# Patient Record
Sex: FEMALE | Race: WHITE | Employment: FULL TIME | URBAN - METROPOLITAN AREA
[De-identification: names, ages, dates, MRNs, and addresses within clinical notes are randomized per-mention and may not be internally consistent; named-entity substitution may affect disease eponyms.]

---

## 2019-01-21 LAB
CHLAMYDIA, EXTERNAL: NEGATIVE
HBSAG, EXTERNAL: NON REACTIVE
HIV, EXTERNAL: NON REACTIVE
N. GONORRHEA, EXTERNAL: NEGATIVE
RUBELLA, EXTERNAL: NORMAL
T. PALLIDUM, EXTERNAL: NEGATIVE
TYPE, ABO & RH, EXTERNAL: NORMAL

## 2019-08-02 LAB — GRBS, EXTERNAL: NEGATIVE

## 2019-08-28 ENCOUNTER — HOSPITAL ENCOUNTER (INPATIENT)
Age: 24
LOS: 2 days | Discharge: HOME | End: 2019-08-30
Attending: OBSTETRICS & GYNECOLOGY | Admitting: OBSTETRICS & GYNECOLOGY
Payer: COMMERCIAL

## 2019-08-28 LAB
ALBUMIN SERPL-MCNC: 2.5 G/DL (ref 3.5–5)
ALBUMIN/GLOB SERPL: 0.7 {RATIO} (ref 1.1–2.2)
ALP SERPL-CCNC: 274 U/L (ref 45–117)
ALT SERPL-CCNC: 14 U/L (ref 12–78)
ANION GAP SERPL CALC-SCNC: 9 MMOL/L (ref 5–15)
AST SERPL-CCNC: 13 U/L (ref 15–37)
BASOPHILS # BLD: 0 K/UL (ref 0–0.1)
BASOPHILS NFR BLD: 0 % (ref 0–1)
BILIRUB SERPL-MCNC: 0.3 MG/DL (ref 0.2–1)
BUN SERPL-MCNC: 7 MG/DL (ref 6–20)
BUN/CREAT SERPL: 11 (ref 12–20)
CALCIUM SERPL-MCNC: 8.8 MG/DL (ref 8.5–10.1)
CHLORIDE SERPL-SCNC: 106 MMOL/L (ref 97–108)
CO2 SERPL-SCNC: 21 MMOL/L (ref 21–32)
CREAT SERPL-MCNC: 0.62 MG/DL (ref 0.55–1.02)
CREAT UR-MCNC: 59.1 MG/DL
DIFFERENTIAL METHOD BLD: ABNORMAL
EOSINOPHIL # BLD: 0.1 K/UL (ref 0–0.4)
EOSINOPHIL NFR BLD: 1 % (ref 0–7)
ERYTHROCYTE [DISTWIDTH] IN BLOOD BY AUTOMATED COUNT: 14.6 % (ref 11.5–14.5)
GLOBULIN SER CALC-MCNC: 3.7 G/DL (ref 2–4)
GLUCOSE SERPL-MCNC: 94 MG/DL (ref 65–100)
HCT VFR BLD AUTO: 36.6 % (ref 35–47)
HGB BLD-MCNC: 11.6 G/DL (ref 11.5–16)
IMM GRANULOCYTES # BLD AUTO: 0 K/UL (ref 0–0.04)
IMM GRANULOCYTES NFR BLD AUTO: 0 % (ref 0–0.5)
LYMPHOCYTES # BLD: 1.9 K/UL (ref 0.8–3.5)
LYMPHOCYTES NFR BLD: 18 % (ref 12–49)
MCH RBC QN AUTO: 26.9 PG (ref 26–34)
MCHC RBC AUTO-ENTMCNC: 31.7 G/DL (ref 30–36.5)
MCV RBC AUTO: 84.7 FL (ref 80–99)
MONOCYTES # BLD: 0.7 K/UL (ref 0–1)
MONOCYTES NFR BLD: 6 % (ref 5–13)
NEUTS SEG # BLD: 7.7 K/UL (ref 1.8–8)
NEUTS SEG NFR BLD: 75 % (ref 32–75)
NRBC # BLD: 0 K/UL (ref 0–0.01)
NRBC BLD-RTO: 0 PER 100 WBC
PLATELET # BLD AUTO: 191 K/UL (ref 150–400)
PMV BLD AUTO: 11.5 FL (ref 8.9–12.9)
POTASSIUM SERPL-SCNC: 4.2 MMOL/L (ref 3.5–5.1)
PROT SERPL-MCNC: 6.2 G/DL (ref 6.4–8.2)
PROT UR-MCNC: 50 MG/DL (ref 0–11.9)
PROT/CREAT UR-RTO: 0.8
RBC # BLD AUTO: 4.32 M/UL (ref 3.8–5.2)
SODIUM SERPL-SCNC: 136 MMOL/L (ref 136–145)
URATE SERPL-MCNC: 5.5 MG/DL (ref 2.6–6)
WBC # BLD AUTO: 10.4 K/UL (ref 3.6–11)

## 2019-08-28 PROCEDURE — 85025 COMPLETE CBC W/AUTO DIFF WBC: CPT

## 2019-08-28 PROCEDURE — 84156 ASSAY OF PROTEIN URINE: CPT

## 2019-08-28 PROCEDURE — 74011250636 HC RX REV CODE- 250/636: Performed by: ADVANCED PRACTICE MIDWIFE

## 2019-08-28 PROCEDURE — 65270000029 HC RM PRIVATE

## 2019-08-28 PROCEDURE — 84550 ASSAY OF BLOOD/URIC ACID: CPT

## 2019-08-28 PROCEDURE — 36415 COLL VENOUS BLD VENIPUNCTURE: CPT

## 2019-08-28 PROCEDURE — 80053 COMPREHEN METABOLIC PANEL: CPT

## 2019-08-28 PROCEDURE — 76060000078 HC EPIDURAL ANESTHESIA

## 2019-08-28 PROCEDURE — 74011250636 HC RX REV CODE- 250/636

## 2019-08-28 PROCEDURE — 74011250636 HC RX REV CODE- 250/636: Performed by: OBSTETRICS & GYNECOLOGY

## 2019-08-28 PROCEDURE — 4A0HXCZ MEASUREMENT OF PRODUCTS OF CONCEPTION, CARDIAC RATE, EXTERNAL APPROACH: ICD-10-PCS | Performed by: OBSTETRICS & GYNECOLOGY

## 2019-08-28 PROCEDURE — 74011000258 HC RX REV CODE- 258: Performed by: ADVANCED PRACTICE MIDWIFE

## 2019-08-28 PROCEDURE — 75410000002 HC LABOR FEE PER 1 HR

## 2019-08-28 RX ORDER — FENTANYL CITRATE 50 UG/ML
50 INJECTION, SOLUTION INTRAMUSCULAR; INTRAVENOUS
Status: DISCONTINUED | OUTPATIENT
Start: 2019-08-28 | End: 2019-08-29 | Stop reason: HOSPADM

## 2019-08-28 RX ORDER — OXYTOCIN/0.9 % SODIUM CHLORIDE 30/500 ML
PLASTIC BAG, INJECTION (ML) INTRAVENOUS
Status: DISCONTINUED
Start: 2019-08-28 | End: 2019-08-28

## 2019-08-28 RX ORDER — SODIUM CHLORIDE, SODIUM LACTATE, POTASSIUM CHLORIDE, CALCIUM CHLORIDE 600; 310; 30; 20 MG/100ML; MG/100ML; MG/100ML; MG/100ML
125 INJECTION, SOLUTION INTRAVENOUS CONTINUOUS
Status: DISCONTINUED | OUTPATIENT
Start: 2019-08-28 | End: 2019-08-31 | Stop reason: HOSPADM

## 2019-08-28 RX ORDER — OXYTOCIN IN 5 % DEXTROSE 30/500 ML
0-25 PLASTIC BAG, INJECTION (ML) INTRAVENOUS
Status: DISCONTINUED | OUTPATIENT
Start: 2019-08-28 | End: 2019-08-28

## 2019-08-28 RX ORDER — NALBUPHINE HYDROCHLORIDE 10 MG/ML
10 INJECTION, SOLUTION INTRAMUSCULAR; INTRAVENOUS; SUBCUTANEOUS
Status: DISCONTINUED | OUTPATIENT
Start: 2019-08-28 | End: 2019-08-29 | Stop reason: HOSPADM

## 2019-08-28 RX ORDER — OXYTOCIN/0.9 % SODIUM CHLORIDE 30/500 ML
0-25 PLASTIC BAG, INJECTION (ML) INTRAVENOUS
Status: DISCONTINUED | OUTPATIENT
Start: 2019-08-28 | End: 2019-08-31 | Stop reason: HOSPADM

## 2019-08-28 RX ORDER — BUTALBITAL, ACETAMINOPHEN AND CAFFEINE 50; 325; 40 MG/1; MG/1; MG/1
1 TABLET ORAL
COMMUNITY
End: 2019-08-30

## 2019-08-28 RX ORDER — TERBUTALINE SULFATE 1 MG/ML
0.25 INJECTION SUBCUTANEOUS AS NEEDED
Status: DISCONTINUED | OUTPATIENT
Start: 2019-08-28 | End: 2019-08-29 | Stop reason: HOSPADM

## 2019-08-28 RX ADMIN — Medication 1 MILLI-UNITS/MIN: at 18:23

## 2019-08-28 RX ADMIN — NALBUPHINE HYDROCHLORIDE 10 MG: 10 INJECTION, SOLUTION INTRAMUSCULAR; INTRAVENOUS; SUBCUTANEOUS at 21:44

## 2019-08-28 RX ADMIN — PROMETHAZINE HYDROCHLORIDE 25 MG: 25 INJECTION INTRAMUSCULAR; INTRAVENOUS at 21:53

## 2019-08-28 RX ADMIN — SODIUM CHLORIDE, SODIUM LACTATE, POTASSIUM CHLORIDE, AND CALCIUM CHLORIDE 125 ML/HR: 600; 310; 30; 20 INJECTION, SOLUTION INTRAVENOUS at 18:25

## 2019-08-28 NOTE — PROGRESS NOTES
1545 Bedside and verbal report received from DR Lex Gallegos at bedside discussing plan of care with patient. 1949 Bedside and verbal report given to RHIANNON Machuca

## 2019-08-28 NOTE — PROGRESS NOTES
2019  1:11 PM Pt received from office with irregular contractions, , 39.5 weeks, elevated BP today, in L&D for lab work and BP monitoring. Pt states she stopped in bathroom downstairs on way up and doesn't need to void. Changing into gown  1330 PIV to saline lock and labs drawn. Assessment completed. Denies headache or epigastric pain, Swelling increased today, non pitting, slight blurred vision. 1400 Dr. Ailyn Felix at bedside discussing plan to admit patient for early labor and augmentation if needed.  Consent signed  5325 Pt off monitor and up to walk

## 2019-08-28 NOTE — PROGRESS NOTES
PreE labs normal, protein: creatinine pending. BPs mildly elevated - she has blurry vision (x 2 days) but no headache or RUQ pain. At minimum, she has gestational hypertension and clinically seems to be in early labor. Will plan to admit for early labor and manage expectantly for now. If contractions space, recommend pitocin augmentation.   Patient is in agreement and does not feel comfortable leaving the hospital.

## 2019-08-28 NOTE — H&P
Cc: contractions    HPI:   23yo G1 @ 39w5d presented to the office today for a labor check with complaints of contractions every 10 minutes. Her cervix had changed from 2cm/60% yesterday to 3cm/80% today. Incidentally, her BP was elevated. BPs were 130/100, then 130/98. She denies HA, change in vision, RUQ pain. She was sent to L&D to be evaluated for PreEclampsia and possible early labor. Of note, she has a remote history of MRSA infection, but had two negative cultures this pregnancy  in the first and third trimesters. ROS  Additionally reports: Except as noted in the HPI, the review of systems is negative for General and . Past Medical History  Notes: MRSA age 17-15 x2    Allergies  NKDA        Medications     butalbital-acetaminophen-caffeine 50 mg-325 mg-40 mg tablet  TAKE 1 TABLET BY MOUTH EVERY 6 HOURS AS NEEDED FOR HEADACHE 19   filled [address]   Prenatal 19   entered Alyssa Noel     Vaccine Type Date Amt. Route Site [address] Lot # Mfr. Exp.   Date Date  on VIS VIS  Given Vaccinator   Diphtheria, Tetanus, Pertussis   Tdap 19 0.5 mL Intramuscular Deltoid, Right  H54Ex [employer] 06/14/21 02/24/15 06/06/19 Swann Cough                                                     Influenza   influenza, injectable, quadrivalent 10/01/18                                                                 Family History  Maternal Grandmother - Mamalignant tumor of ovary (onset age: 79)  - s/p full hyst   Social History  Social History not reviewed (last reviewed 2019)  OB/GYN Social History  Tobacco Smoking Status: Former smoker  Most Recent Tobacco Use Screenin2019  Marital status: Single (Notes: Stable Relationship)  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 Date of Last Pap Smear: 01/21/2019 (Notes: LSIL). Abnormal Pap: Y. Abnormal Pap Smear result: LSIL (Notes: 1/21/2019). Sexually Active?: Y.  STIs/STDs: N.  Endometriosis: N. Fibroids: N. Ovarian Cyst: N. PCOS: N.  Obstetric History  Obstetric History not reviewed (last reviewed 02/14/2019)    TOTAL FULL PRE AB. I AB.  S ECTOPICS MULTIPLE LIVING   1          Pregnancy Problem List   Low grade squamous intraepithelial lesion on cervical Papanicolaou smear - 1/2019 colpo benign, repeat pap 1/20   History of migraine - fioricet   Primigravida   Methicillin resistant Staphylococcus aureus infection - h/o, culture 1st trim neg, 3rd trim neg    GIRL \"Criss\"  Genetic Screening and Infection History  Patient's Age Will Be 28 Years Or Older At Estimated Date of Delivery: N  2824: Thalassemia ([facility], [place], [place], Or  Background): MCV < 80: N  Neural Tube Defect (Meningomyelocele, Spina Bifida, Or Anencephaly): N  746: Congenital Heart Defect: N  7580: Down Syndrome: N  Morgan-Sachs (eg, [facility]): N  Canavan Disease: N  282: Sickle Cell Disease Or Trait (): N  Hemophilia Or Other Blood Disorders: N  359: Muscular Dystrophy: N  2770: Cystic Fibrosis: N  3334: [place]'s Chorea: N  319: Mental Retardation/Autism: N  If Yes, Was Person Tested For Fragile X?: N  Other Inherited Genetic Or Chromosomal Disorder: N  Maternal Metabolic Disorder (eg, Type 1 Diabetes, PKU): N  Patient Or [de-identified] Father Had A Child With Birth Defects Not Listed Above: N  Recurrent Pregnancy Loss, Or A Stillbirth: N  Medications (including Supplements, Vitamins, Herbs, OTC Drugs), Illicit/Recreational Drugs, Alcohol: N  If Yes, Agent(s) And Strength/Dosage: N  Any Other Genetic History: N  Live With Someone With TB Or Exposed To TB: N  Patient Or Partner Has History Of Genital Herpes: N  Rash Or Viral Illness Since Last Menstrual Period: N  History Of STD, Gonorrhea, Chlamydia, HPV, Syphilis: N  Other Infection History: N  History of HIV: N  History of Hepatitis: N  Prior GBS-infected child: N  Prenatal Flowsheet  Fundus Pres FHR FM PLS Cervix Exam BP Wt Edema Glucose Protein Leukocytes Nitrite Ketones Blood   01/21/2019     [id number]                         177    118/64 163 lbs  none neg       Comments: U/S confirms viable IUP. Reports daily HAs, hx of migraines. Has been taking Tylenol with little relief. Also reports N/V, no meds. Denies discharge/bleeding/cramping. Flu vaccine received elsewhere this season. NO prenatal folder available. Reviewed handouts for aneuploidy testing. Pediatric MA. S/p flu shot. Work precautions. CMV, etc immunity today. Declines all aneuploidy and carrier testing. Distant h/o MRSA. Nares culture collected. Pap today with cultures and PNL   02/14/2019     [place]                         155    120/78 160 lbs  none neg       Comments: Rm 1. ROSA w/ colpo today. reviewed histories and labs, discussed hospitalist/laborist system. LSIL pap, colpo done. Plan PP pap. Plans to decline tetra. HA remain a problem - will start magnesium 500mg daily and rx fioricet provided. 03/14/2019     [place]                       16 wks  148    118/78 160 lbs none none neg       Comments: Rm 1: fioricet helping with headaches, doing well. Tetra today. U/S next visit. 04/09/2019   19 wks 4 days   [place]                       20 wks  148    118/60 162 lbs none none neg       Comments: Rm 2: anatomy scan prior - GIRL, nl anatomy on prelim, post placenta, not feeling FM yet. fioricet still helping with headaches (usually just once a day, when at work)   05/03/2019   23 wks   [place]                         136 Yes   118/70 165 lbs none none neg       Comments: Rm 1: active FM, headaches seem to be getting better, less often.  28wk labs and TDaP next visit   06/06/2019   27 wks 6 days   [place]                       28 cm  140 Yes   122/60 169 lbs none none neg       Comments: Rm 1: doing well, headaches once weekly. Rh+, 28wk labs and TDaP today, reviewed hospital classes, choosing pediatrician and postpartum contraception (plans nexplanon), discussed [address] and PTL precautions   06/20/2019   29 wks 6 days   [place]                       30 cm  136 Yes   104/68 172 lbs none none neg       Comments: Rm 2: elevated GTT, normal 3 hour, headaches gone, doing well. MRSA swab today. 07/02/2019   31 wks 4 days   [place]                       31 cm  125 Yes   118/70 172 lbs trace none trace       Comments: Rm 2: c/o labial swelling and tenderness, stable for the last 4-5 days - labial varicosities. Active FM. No HA.   07/18/2019   33 wks 6 days   [place]                       34 cm  134 Yes   104/72 176 lbs none none neg       Comments: Rm 2: doing well, labial swelling better this week, no swelling unless outside/on feet all day. precautions reviewed, GBS and VScan next visit   08/02/2019   36 wks   [place]                       36 cm Vertex 130 Yes  1cm / 30% / -3 120/76 182 lbs 1+         Comments: Rm 2: GBS (NKDA) and Vscan today, no ctx/bleeding/LOF. Precautions reviewed. 08/08/2019   36 wks 6 days   [place]                       37 cm Vertex 120 Yes  1cm / 50% / -3 119/68 183 lbs 1+         Comments: Rm 1: GBS neg, no ctx. Precautions reviewed. 08/15/2019   37 wks 6 days   [place]                       38 cm Vertex 128 Yes  1cm / 50% / -3 138/80 184 lbs 1+ none neg       Comments: Rm 1: active FM, increased pressure/pain, especially worse over the last 2 days and when standing up. PreE and labor precautions. 08/23/2019   39 wks   [place]                       39 cm  128 Yes  1cm / 60% / -2 120/80 187 lbs 1+ none neg       Comments: Rm 1: Doing well, no ctx, reports pressure starting last night. Precautions reviewed.    08/27/2019   39 wks 4 days   [place]                       39 cm Vertex 130 Yes  2cm / 60% / -2 120/80 190 lbs 1+ none trace       Comments: RM 1: No ctx only pressure. No vb/lof. Membranes stripped per request. Precautions reviewed. 08/28/2019   39 wks 5 days   [email or web address]                       39 cm Vertex 129 Yes  3cm / 80% / -2 130/100   130/98 188 lbs 2+ none trace       Comments: RM 15: C/O consecutive ctx every 10 mins. Pt had membranes swept yesterday. Notes bloody discharge this am. Denies LOF. +FM. Cervix 3/80/-2, pt comfortable. Discussed likely early labor. Initial BP slightly elevated. Denies sx of preE. Gave options of preE labs with BP recheck tomorrow as outpatient vs. evaluation in L&D. Pt desires to go to L&D. Discussed possible need for IOL if rules in for GHTN vs. preE. Strict precautions reviewed. OB Lab Results    Result Value Ref.  Range Date Collected Date Reviewed Reviewed By Note   Initial Labs             Blood Type O  01/21/2019 01/23/2019 [id number]        D (Rh) Type Positive  01/21/2019 01/23/2019 [id number]        Antibody Screen Negative NEGATIVE 01/21/2019 01/23/2019 [id number]        Antibody Screen Negative NEGATIVE 06/06/2019 06/09/2019 [facility]        HCT - Initial 42.0 % 34.0-46.6 01/21/2019 01/23/2019 [id number]        HGB - Initial 14.5 g/dL 11.1-15.9 01/21/2019 01/23/2019 [id number]        MCV - Initial 85 fL 79-97 01/21/2019 01/23/2019 [id number]        PLT - Initial 319 x10E3/uL 150-379 01/21/2019 01/23/2019 [id number]        VDRL - Initial   01/21/2019 01/23/2019 [id number]        Urine Culture/Screen No growth  01/21/2019 01/23/2019 [id number]        HBsAg Negative NEGATIVE 01/21/2019 01/23/2019 [id number]        HIV Counseling/Testing Non Reactive NON REACTIVE 01/21/2019 01/23/2019 [id number]        Chlamydia - Initial Negative NEGATIVE 01/21/2019 01/23/2019 [id number]        Gonorrhea - Initial Negative NEGATIVE 01/21/2019 01/23/2019 [id number]        Varicella             Rubella 2.38 index IMMUNE >0.99 01/21/2019 01/23/2019 [id number]    Optional Labs             HGB Electrophoresis             PPD/Quanta             Pap Test EPCA,CHVIRB  01/21/2019 01/23/2019 [id number]        Cystic Fibrosis             HPV   01/21/2019 01/23/2019 [id number]        Morgan-Sac             Familial Dysautonomia             Genetic Screening Tests             NST             TSH             Drug screen             HCV Ab             HCV RNA             Urinalysis             Rhogam Injection         8-20 Week Labs             Ultrasound - Initial             1st Trimester Aneuploidy Risk Assessment             MSAFP/Multiple Markers Report  03/14/2019 03/17/2019 [facility]        2nd Trimester Serum Screening Negative  03/14/2019 03/17/2019 [facility]        Amnio/CVS             Karyotype             Amniotic Fluid (AFP)         24-28 Week Labs             HCT - 24-28 Weeks 35.6 % 34.0-46.6 06/06/2019 06/09/2019 [facility]        HGB - 24-28 Weeks 12.0 g/dL 11.1-15.9 06/06/2019 06/09/2019 [facility]        MCV - 24-28 Weeks             PLT - 24-28 Weeks 272 x10E3/uL 150-450 06/06/2019 06/09/2019 [facility]        Diabetes Screen 155 mg/dL  06/06/2019 06/09/2019 [facility]        Diabetes Screen Comment  06/12/2019 06/13/2019 [facility]        GTT (If Screen Abnormal) Comment  06/12/2019 06/13/2019 [facility]        D (Rh) Antibody Screen         32-36 Week Labs             HCT - 32-36 Weeks             HGB - 32-36 Weeks             MCV - 32-36 Weeks             PLT - 32-36 Weeks             Ultrasound - 32-36 Weeks             HIV (When Indicated)             VDRL - 32-36 Weeks   06/06/2019 06/09/2019 [facility]        Gonorrhea - 32-36 Weeks             Chlamydia - 32-36 Weeks             Depression screening (when indicated)         35-37 Week Labs             Group B Strep Negative NEGATIVE 08/02/2019 08/05/2019 [facility]        Resistance testing if penicillin allergic         Other Labs Other - PARVOVIRUS B19 IGG+IGM AB, SERUM 0.2 index 0.0-0.8 01/21/2019 01/23/2019 [id number]        Other - CYTOMEGALOVIRUS IGG AB, QUANT, IMMUNOASSAY, SERUM OR PLASMA <0.60 U/mL 0.00-0.59 01/21/2019 01/23/2019 [id number]    Physical Exam  Patient is a 69-year-old female. Constitutional: General Appearance: well developed and nourished and pleasant. Level of Distress: no acute distress. Ambulation: ambulating normally. Head: Head: normocephalic. Ears, Nose, Mouth, Throat: Ears normal hearing. Lungs / Chest: Respiratory effort: unlabored. Abdomen: Inspection and Palpation: Gravid, nontender. Female : Cervix: 3/80/-2 per Dr. Bj Pablo office check; repeat check deferred on admission    Skin: General Skin no rash or suspicious lesions. Neurologic: Gait and Station: normal gait. Sensation: grossly intact. Motor: grossly intact. Mental Status Exam: Orientation oriented to person, place, and time. Assessment / Plan    1. Elevated BP: check PreE labs and serial BP monitoring to evaluate for GHTN vs. PreEclampsia  2. Contractions: will evaluate for early labor. GBS positive  3. IUP: reassuring fetal surveillance.   Intermittent fetal monitoring for now

## 2019-08-29 ENCOUNTER — ANESTHESIA EVENT (OUTPATIENT)
Dept: LABOR AND DELIVERY | Age: 24
End: 2019-08-29
Payer: COMMERCIAL

## 2019-08-29 ENCOUNTER — ANESTHESIA (OUTPATIENT)
Dept: LABOR AND DELIVERY | Age: 24
End: 2019-08-29
Payer: COMMERCIAL

## 2019-08-29 PROCEDURE — 74011250636 HC RX REV CODE- 250/636: Performed by: ANESTHESIOLOGY

## 2019-08-29 PROCEDURE — 0KQM0ZZ REPAIR PERINEUM MUSCLE, OPEN APPROACH: ICD-10-PCS | Performed by: OBSTETRICS & GYNECOLOGY

## 2019-08-29 PROCEDURE — 74011250636 HC RX REV CODE- 250/636

## 2019-08-29 PROCEDURE — 75410000002 HC LABOR FEE PER 1 HR

## 2019-08-29 PROCEDURE — 65410000002 HC RM PRIVATE OB

## 2019-08-29 PROCEDURE — 74011250637 HC RX REV CODE- 250/637: Performed by: ADVANCED PRACTICE MIDWIFE

## 2019-08-29 PROCEDURE — 74011000250 HC RX REV CODE- 250

## 2019-08-29 PROCEDURE — 77030011943

## 2019-08-29 PROCEDURE — 77030014125 HC TY EPDRL BBMI -B: Performed by: ANESTHESIOLOGY

## 2019-08-29 PROCEDURE — 0UQMXZZ REPAIR VULVA, EXTERNAL APPROACH: ICD-10-PCS | Performed by: OBSTETRICS & GYNECOLOGY

## 2019-08-29 PROCEDURE — 75410000000 HC DELIVERY VAGINAL/SINGLE

## 2019-08-29 PROCEDURE — 74011000250 HC RX REV CODE- 250: Performed by: ANESTHESIOLOGY

## 2019-08-29 PROCEDURE — A4300 CATH IMPL VASC ACCESS PORTAL: HCPCS

## 2019-08-29 PROCEDURE — 3E0R3BZ INTRODUCTION OF ANESTHETIC AGENT INTO SPINAL CANAL, PERCUTANEOUS APPROACH: ICD-10-PCS | Performed by: ANESTHESIOLOGY

## 2019-08-29 PROCEDURE — 74011250636 HC RX REV CODE- 250/636: Performed by: OBSTETRICS & GYNECOLOGY

## 2019-08-29 PROCEDURE — 75410000003 HC RECOV DEL/VAG/CSECN EA 0.5 HR

## 2019-08-29 PROCEDURE — 00HU33Z INSERTION OF INFUSION DEVICE INTO SPINAL CANAL, PERCUTANEOUS APPROACH: ICD-10-PCS | Performed by: ANESTHESIOLOGY

## 2019-08-29 RX ORDER — AMMONIA 15 % (W/V)
1 AMPUL (EA) INHALATION AS NEEDED
Status: DISCONTINUED | OUTPATIENT
Start: 2019-08-29 | End: 2019-08-31 | Stop reason: HOSPADM

## 2019-08-29 RX ORDER — FENTANYL/BUPIVACAINE/NS/PF 2-1250MCG
PREFILLED PUMP RESERVOIR EPIDURAL
Status: COMPLETED
Start: 2019-08-29 | End: 2019-08-29

## 2019-08-29 RX ORDER — FENTANYL CITRATE 50 UG/ML
INJECTION, SOLUTION INTRAMUSCULAR; INTRAVENOUS AS NEEDED
Status: DISCONTINUED | OUTPATIENT
Start: 2019-08-29 | End: 2019-08-29 | Stop reason: HOSPADM

## 2019-08-29 RX ORDER — SODIUM CHLORIDE 0.9 % (FLUSH) 0.9 %
5-40 SYRINGE (ML) INJECTION AS NEEDED
Status: DISCONTINUED | OUTPATIENT
Start: 2019-08-29 | End: 2019-08-31 | Stop reason: HOSPADM

## 2019-08-29 RX ORDER — SODIUM CHLORIDE 0.9 % (FLUSH) 0.9 %
5-40 SYRINGE (ML) INJECTION EVERY 8 HOURS
Status: DISCONTINUED | OUTPATIENT
Start: 2019-08-29 | End: 2019-08-31 | Stop reason: HOSPADM

## 2019-08-29 RX ORDER — NALOXONE HYDROCHLORIDE 0.4 MG/ML
0.4 INJECTION, SOLUTION INTRAMUSCULAR; INTRAVENOUS; SUBCUTANEOUS AS NEEDED
Status: DISCONTINUED | OUTPATIENT
Start: 2019-08-29 | End: 2019-08-29 | Stop reason: HOSPADM

## 2019-08-29 RX ORDER — EPHEDRINE SULFATE/0.9% NACL/PF 50 MG/5 ML
10 SYRINGE (ML) INTRAVENOUS
Status: DISCONTINUED | OUTPATIENT
Start: 2019-08-29 | End: 2019-08-29 | Stop reason: HOSPADM

## 2019-08-29 RX ORDER — FENTANYL/BUPIVACAINE/NS/PF 2-1250MCG
1-16 PREFILLED PUMP RESERVOIR EPIDURAL CONTINUOUS
Status: DISCONTINUED | OUTPATIENT
Start: 2019-08-29 | End: 2019-08-31 | Stop reason: HOSPADM

## 2019-08-29 RX ORDER — BUPIVACAINE HYDROCHLORIDE 2.5 MG/ML
INJECTION, SOLUTION EPIDURAL; INFILTRATION; INTRACAUDAL
Status: COMPLETED
Start: 2019-08-29 | End: 2019-08-29

## 2019-08-29 RX ORDER — FENTANYL CITRATE 50 UG/ML
INJECTION, SOLUTION INTRAMUSCULAR; INTRAVENOUS
Status: COMPLETED
Start: 2019-08-29 | End: 2019-08-29

## 2019-08-29 RX ORDER — HYDROCORTISONE 1 %
CREAM (GRAM) TOPICAL AS NEEDED
Status: DISCONTINUED | OUTPATIENT
Start: 2019-08-29 | End: 2019-08-31 | Stop reason: HOSPADM

## 2019-08-29 RX ORDER — BUPIVACAINE HYDROCHLORIDE 2.5 MG/ML
INJECTION, SOLUTION EPIDURAL; INFILTRATION; INTRACAUDAL
Status: COMPLETED | OUTPATIENT
Start: 2019-08-29 | End: 2019-08-29

## 2019-08-29 RX ORDER — BUPIVACAINE HYDROCHLORIDE 2.5 MG/ML
25 INJECTION, SOLUTION EPIDURAL; INFILTRATION; INTRACAUDAL ONCE
Status: ACTIVE | OUTPATIENT
Start: 2019-08-29 | End: 2019-08-29

## 2019-08-29 RX ORDER — OXYTOCIN/RINGER'S LACTATE 20/1000 ML
999 PLASTIC BAG, INJECTION (ML) INTRAVENOUS AS NEEDED
Status: DISCONTINUED | OUTPATIENT
Start: 2019-08-29 | End: 2019-08-31 | Stop reason: HOSPADM

## 2019-08-29 RX ORDER — DOCUSATE SODIUM 100 MG/1
100 CAPSULE, LIQUID FILLED ORAL
Status: DISCONTINUED | OUTPATIENT
Start: 2019-08-29 | End: 2019-08-31 | Stop reason: HOSPADM

## 2019-08-29 RX ORDER — EPHEDRINE SULFATE/0.9% NACL/PF 50 MG/5 ML
SYRINGE (ML) INTRAVENOUS
Status: DISCONTINUED
Start: 2019-08-29 | End: 2019-08-29 | Stop reason: WASHOUT

## 2019-08-29 RX ORDER — BUPIVACAINE HYDROCHLORIDE 2.5 MG/ML
INJECTION, SOLUTION EPIDURAL; INFILTRATION; INTRACAUDAL AS NEEDED
Status: DISCONTINUED | OUTPATIENT
Start: 2019-08-29 | End: 2019-08-29

## 2019-08-29 RX ORDER — LIDOCAINE HYDROCHLORIDE AND EPINEPHRINE 15; 5 MG/ML; UG/ML
INJECTION, SOLUTION EPIDURAL AS NEEDED
Status: DISCONTINUED | OUTPATIENT
Start: 2019-08-29 | End: 2019-08-29 | Stop reason: HOSPADM

## 2019-08-29 RX ORDER — ACETAMINOPHEN 325 MG/1
650 TABLET ORAL
Status: DISCONTINUED | OUTPATIENT
Start: 2019-08-29 | End: 2019-08-31 | Stop reason: HOSPADM

## 2019-08-29 RX ORDER — FENTANYL CITRATE 50 UG/ML
100 INJECTION, SOLUTION INTRAMUSCULAR; INTRAVENOUS ONCE
Status: DISCONTINUED | OUTPATIENT
Start: 2019-08-29 | End: 2019-08-29 | Stop reason: HOSPADM

## 2019-08-29 RX ORDER — ZOLPIDEM TARTRATE 5 MG/1
5 TABLET ORAL
Status: DISCONTINUED | OUTPATIENT
Start: 2019-08-29 | End: 2019-08-31 | Stop reason: HOSPADM

## 2019-08-29 RX ORDER — IBUPROFEN 400 MG/1
800 TABLET ORAL EVERY 8 HOURS
Status: DISCONTINUED | OUTPATIENT
Start: 2019-08-29 | End: 2019-08-31 | Stop reason: HOSPADM

## 2019-08-29 RX ORDER — HYDROCORTISONE ACETATE PRAMOXINE HCL 2.5; 1 G/100G; G/100G
CREAM TOPICAL AS NEEDED
Status: DISCONTINUED | OUTPATIENT
Start: 2019-08-29 | End: 2019-08-31 | Stop reason: HOSPADM

## 2019-08-29 RX ORDER — SODIUM CHLORIDE, SODIUM LACTATE, POTASSIUM CHLORIDE, CALCIUM CHLORIDE 600; 310; 30; 20 MG/100ML; MG/100ML; MG/100ML; MG/100ML
100 INJECTION, SOLUTION INTRAVENOUS CONTINUOUS
Status: DISCONTINUED | OUTPATIENT
Start: 2019-08-29 | End: 2019-08-29 | Stop reason: HOSPADM

## 2019-08-29 RX ORDER — SIMETHICONE 80 MG
80 TABLET,CHEWABLE ORAL
Status: DISCONTINUED | OUTPATIENT
Start: 2019-08-29 | End: 2019-08-31 | Stop reason: HOSPADM

## 2019-08-29 RX ADMIN — BUPIVACAINE HYDROCHLORIDE 2 ML: 2.5 INJECTION, SOLUTION EPIDURAL; INFILTRATION; INTRACAUDAL; PERINEURAL at 00:50

## 2019-08-29 RX ADMIN — ACETAMINOPHEN 650 MG: 325 TABLET, FILM COATED ORAL at 14:35

## 2019-08-29 RX ADMIN — LIDOCAINE HYDROCHLORIDE,EPINEPHRINE BITARTRATE 5 ML: 15; .005 INJECTION, SOLUTION EPIDURAL; INFILTRATION; INTRACAUDAL; PERINEURAL at 00:52

## 2019-08-29 RX ADMIN — Medication 10 ML/HR: at 00:53

## 2019-08-29 RX ADMIN — BUPIVACAINE HYDROCHLORIDE 3 ML: 2.5 INJECTION, SOLUTION EPIDURAL; INFILTRATION; INTRACAUDAL; PERINEURAL at 00:52

## 2019-08-29 RX ADMIN — SODIUM CHLORIDE, SODIUM LACTATE, POTASSIUM CHLORIDE, AND CALCIUM CHLORIDE 500 ML: 600; 310; 30; 20 INJECTION, SOLUTION INTRAVENOUS at 00:10

## 2019-08-29 RX ADMIN — ACETAMINOPHEN 650 MG: 325 TABLET, FILM COATED ORAL at 23:45

## 2019-08-29 RX ADMIN — FENTANYL CITRATE 100 MCG: 50 INJECTION, SOLUTION INTRAMUSCULAR; INTRAVENOUS at 00:52

## 2019-08-29 RX ADMIN — ACETAMINOPHEN 650 MG: 325 TABLET, FILM COATED ORAL at 08:57

## 2019-08-29 RX ADMIN — SODIUM CHLORIDE, SODIUM LACTATE, POTASSIUM CHLORIDE, AND CALCIUM CHLORIDE 1000 ML: 600; 310; 30; 20 INJECTION, SOLUTION INTRAVENOUS at 00:20

## 2019-08-29 RX ADMIN — IBUPROFEN 800 MG: 400 TABLET, FILM COATED ORAL at 08:28

## 2019-08-29 RX ADMIN — IBUPROFEN 800 MG: 400 TABLET, FILM COATED ORAL at 16:58

## 2019-08-29 NOTE — PROGRESS NOTES
Post-Partum Day Number 0 Progress Note    Radha Mercedes     Assessment: Doing well, post partum day 0; uncomplicated  with slightly increased lochia immediately post delivery    Plan:  1. Continue routine postpartum and perineal care as well as maternal education. Information for the patient's :  Pauline Esparza [[id number]]   Vaginal, Spontaneous   Patient doing well without significant complaint. Normal lochia; did feel a little lightheaded first time up but states that she's mainly just tired from delivery. Vitals:  Visit Vitals  /80 (BP 1 Location: Right arm, BP Patient Position: At rest)   Pulse (!) 57   Temp 99.3 °F (37.4 °C)   Resp 16   Ht 5' 2\" (1.575 m)   Wt 86.2 kg (190 lb)   SpO2 97%   Breastfeeding? Unknown   BMI 34.75 kg/m²     Temp (24hrs), Av.7 °F (37.1 °C), Min:98.2 °F (36.8 °C), Max:99.4 °F (37.4 °C)        Exam:   Patient without distress. Exam deferred. Labs:     Lab Results   Component Value Date/Time    WBC 10.4 2019 01:40 PM    HGB 11.6 2019 01:40 PM    HCT 36.6 2019 01:40 PM    PLATELET 461  01:40 PM       Recent Results (from the past 24 hour(s))   CBC WITH AUTOMATED DIFF    Collection Time: 19  1:40 PM   Result Value Ref Range    WBC 10.4 3.6 - 11.0 K/uL    RBC 4.32 3.80 - 5.20 M/uL    HGB 11.6 11.5 - 16.0 g/dL    HCT 36.6 35.0 - 47.0 %    MCV 84.7 80.0 - 99.0 FL    MCH 26.9 26.0 - 34.0 PG    MCHC 31.7 30.0 - 36.5 g/dL    RDW 14.6 (H) 11.5 - 14.5 %    PLATELET 205 493 - 919 K/uL    MPV 11.5 8.9 - 12.9 FL    NRBC 0.0 0  WBC    ABSOLUTE NRBC 0.00 0.00 - 0.01 K/uL    NEUTROPHILS 75 32 - 75 %    LYMPHOCYTES 18 12 - 49 %    MONOCYTES 6 5 - 13 %    EOSINOPHILS 1 0 - 7 %    BASOPHILS 0 0 - 1 %    IMMATURE GRANULOCYTES 0 0.0 - 0.5 %    ABS. NEUTROPHILS 7.7 1.8 - 8.0 K/UL    ABS. LYMPHOCYTES 1.9 0.8 - 3.5 K/UL    ABS. MONOCYTES 0.7 0.0 - 1.0 K/UL    ABS. EOSINOPHILS 0.1 0.0 - 0.4 K/UL    ABS.  BASOPHILS 0.0 0.0 - 0.1 K/UL    ABS. IMM. GRANS. 0.0 0.00 - 0.04 K/UL    DF AUTOMATED     METABOLIC PANEL, COMPREHENSIVE    Collection Time: 08/28/19  1:40 PM   Result Value Ref Range    Sodium 136 136 - 145 mmol/L    Potassium 4.2 3.5 - 5.1 mmol/L    Chloride 106 97 - 108 mmol/L    CO2 21 21 - 32 mmol/L    Anion gap 9 5 - 15 mmol/L    Glucose 94 65 - 100 mg/dL    BUN 7 6 - 20 MG/DL    Creatinine 0.62 0.55 - 1.02 MG/DL    BUN/Creatinine ratio 11 (L) 12 - 20      GFR est AA >60 >60 ml/min/1.73m2    GFR est non-AA >60 >60 ml/min/1.73m2    Calcium 8.8 8.5 - 10.1 MG/DL    Bilirubin, total 0.3 0.2 - 1.0 MG/DL    ALT (SGPT) 14 12 - 78 U/L    AST (SGOT) 13 (L) 15 - 37 U/L    Alk. phosphatase 274 (H) 45 - 117 U/L    Protein, total 6.2 (L) 6.4 - 8.2 g/dL    Albumin 2.5 (L) 3.5 - 5.0 g/dL    Globulin 3.7 2.0 - 4.0 g/dL    A-G Ratio 0.7 (L) 1.1 - 2.2     URIC ACID    Collection Time: 08/28/19  1:40 PM   Result Value Ref Range    Uric acid 5.5 2.6 - 6.0 MG/DL   PROTEIN/CREATININE RATIO, URINE    Collection Time: 08/28/19  2:30 PM   Result Value Ref Range    Protein, urine random 50 (H) 0.0 - 11.9 mg/dL    Creatinine, urine 59.10 mg/dL    Protein/Creat.  urine Ratio 0.8

## 2019-08-29 NOTE — L&D DELIVERY NOTE
Delivery Note    Provider:  Leny Gonzales CNM    Assistant: none    Pre-Delivery Diagnosis: Term pregnancy    Post-Delivery Diagnosis: Living  infant(s) and Female    Intrapartum Event: None    Procedure: Spontaneous vaginal delivery    Epidural: YES    Monitor:  Fetal Heart Tones - External and Uterine Contractions - External    Indications for instrumental delivery: none    Estimated Blood Loss: 350 ml    Episiotomy: none    Laceration(s):  2nd degree perineal repaired with rapide, bilateral periurethral repaired with 3-0 vicyrl on SH- both in the usual fashion    Laceration(s) repair: YES    Presentation: Cephalic    Fetal Description: rodrigues    Fetal Position: Left Occiput Posterior    Birth Weight: TBD    Birth Length: TBD    Apgar - One Minute: 8    Apgar - Five Minutes: 9    Umbilical Cord: 3 vessels present  Specimens: Cord blood  Complications:  none           Cord Blood Results:   Information for the patient's :  Keyannajuan j Nitesh [[id number]]   No results found for: PCTABR, PCTDIG, BILI, ABORH    Prenatal Labs:     Lab Results   Component Value Date/Time    HBsAg, External non reactive 2019    HIV, External non reactive 2019    Rubella, External immune 2019    Gonorrhea, External negative 2019    Chlamydia, External negative 2019    GrBStrep, External negative 2019        Attending Attestation: I was present and scrubbed for the entire procedure     live female over second degree perineal with bilateral periurethral tears. vtx delivered in KAITLIN position. Infant placed on maternal abdomen with spontaneous vigorous crying. 3 vessel cord clamped x 2 by CNM then cut by FOB after cessations of pulsations. Spontaneous delivery of intact placenta.   ml     Leny Mix, CNM\

## 2019-08-29 NOTE — ANESTHESIA PROCEDURE NOTES
Epidural Block    Performed by: Uriah Vaughn MD  Authorized by: Uriah Vaughn MD     Pre-Procedure  Indication: labor epidural    Preanesthetic Checklist: patient identified, risks and benefits discussed, anesthesia consent, site marked, patient being monitored, timeout performed and anesthesia consent      Epidural:   Patient position:  Seated  Prep region:  Lumbar  Prep: Betadine    Location:  L2-3    Needle and Epidural Catheter:   Needle Type:  Tuohy  Needle Gauge:  17 G  Injection Technique:  Loss of resistance using saline  Attempts:  1  Catheter Size:  19 G  Events: no blood with aspiration, no cerebrospinal fluid with aspiration, no paresthesia and negative aspiration test    Test Dose:  Bupivacaine 0.25% and negative    Assessment:   Catheter Secured:  Tegaderm and tape  Insertion:  Uncomplicated  Patient tolerance:  Patient tolerated the procedure well with no immediate complications

## 2019-08-29 NOTE — ANESTHESIA PREPROCEDURE EVALUATION
Relevant Problems   No relevant active problems       Anesthetic History   No history of anesthetic complications            Review of Systems / Medical History  Patient summary reviewed, nursing notes reviewed and pertinent labs reviewed    Pulmonary  Within defined limits                 Neuro/Psych   Within defined limits           Cardiovascular    Hypertension                   GI/Hepatic/Renal  Within defined limits              Endo/Other  Within defined limits           Other Findings              Physical Exam    Airway  Mallampati: II  TM Distance: > 6 cm  Neck ROM: normal range of motion   Mouth opening: Normal     Cardiovascular  Regular rate and rhythm,  S1 and S2 normal,  no murmur, click, rub, or gallop             Dental  No notable dental hx       Pulmonary  Breath sounds clear to auscultation               Abdominal  GI exam deferred       Other Findings            Anesthetic Plan    ASA: 2  Anesthesia type: epidural          Induction: Intravenous  Anesthetic plan and risks discussed with: Patient

## 2019-08-29 NOTE — PROGRESS NOTES
Called to room to assess bleeding    Moderate amount lochia with massage,  Moderate amount on pad as well-     Urine easily expressed with uterine massage  Uterus firm at U, no deviation noted. Unable to express clots    Pt temp at delivery was 99.4, now 99.2    A/P  -Minimal risk factors for infection  -Suspect filling bladder- last emptied with straight cath at delivery   -Potential for lower uterine atony - however with filling bladder and firm uterus not inclined to do bimanual exam at this time  -Continue to monitor and call if moderate bleeding continues  -Monitor temp q 1-2 hours and notify MD if increases above 100.4  -Hang second bag of pitocin - notify MD if bleeding increases or remains larger than expected.     Pt and family all verbalized understanding and agreement with plan    Mally Kitchen CNM

## 2019-08-29 NOTE — PROGRESS NOTES
Labor Progress Note  CNM [facility] Service  Patient seen, fetal heart rate and contraction pattern evaluated, pt uncomfortable with contractions, having some bloody show.  Desires epidural   Visit Vitals  /80   Pulse 80   Temp 98.3 °F (36.8 °C)   Resp 16   Ht 5' 2\" (1.575 m)   Wt 190 lb (86.2 kg)   BMI 34.75 kg/m²       Physical Exam:  Cervical Exam:  Deferred until after epidural pt denies urge to push   Membranes:  Intact- moist vaginal area and some leakig of fluid versus urine per pt- nitrazine negative  Uterine Activity: 2-4 moderate  Fetal Heart Rate: Cat 1  Pitocin 12 miliunits    Assessment/Plan:  Reassuring fetal status   Epidural   Straight cath and SVE when appropriate after pt comfortable  Continue to titrate pitocin for adequate contraction pattern and fetal tolerance    Nya Whitmore CNM

## 2019-08-29 NOTE — PROGRESS NOTES
Labor Progress Note  OB service  Assumed care of Emanuel pt from [address] [place] [address] this evening. Patient seen, fetal heart rate and contraction pattern evaluated, pt comfortable in bed, on pitocin for augmentation. GHTN possible PreE per chart- Denies symptoms at this time  Visit Vitals  /80   Pulse 80   Temp 98.3 °F (36.8 °C)   Resp 16   Ht 5' 2\" (1.575 m)   Wt 190 lb (86.2 kg)   BMI 34.75 kg/m²       Physical Exam:  Cervical Exam:  deferred  Membranes:  Intact  Uterine Activity: irreg mild to palpation  Fetal Heart Rate: Reactive    Assessment/Plan:  Reassuring fetal status   Continue augmentation  Collaborative care with Marc Shipley- pt stable at this time- will consult if servere range pressures or concerns arise. Pain management as desires. Pt aware of all above and CNM OB call coverage- in agreement with plan of care.   Leny Gonzales CNM

## 2019-08-29 NOTE — PROGRESS NOTES
0740: Bedside and Verbal shift change report given to ILEANA Tran (oncoming nurse) by RHIANNON Colin (offgoing nurse). Report included the following information SBAR, Procedure Summary, Intake/Output, MAR and Recent Results. 0840: TRANSFER - OUT REPORT:    Verbal report given to RONY Lobo(name) on Rafia Montilla  being transferred to MIU (unit) for routine progression of care       Report consisted of patients Situation, Background, Assessment and   Recommendations(SBAR). Information from the following report(s) SBAR, Procedure Summary, Intake/Output, MAR and Recent Results was reviewed with the receiving nurse. Lines:   Peripheral IV 08/28/19 Anterior; Left Forearm (Active)   Site Assessment Clean, dry, & intact 8/28/2019  3:56 PM   Phlebitis Assessment 0 8/28/2019  3:56 PM   Infiltration Assessment 0 8/28/2019  3:56 PM   Dressing Status Clean, dry, & intact 8/28/2019  3:56 PM   Dressing Type Tape;Transparent 8/28/2019  3:56 PM   Hub Color/Line Status Green 8/28/2019  3:56 PM        Opportunity for questions and clarification was provided.       Patient transported with:   Registered Nurse

## 2019-08-30 VITALS
BODY MASS INDEX: 34.96 KG/M2 | OXYGEN SATURATION: 97 % | SYSTOLIC BLOOD PRESSURE: 128 MMHG | HEART RATE: 96 BPM | RESPIRATION RATE: 16 BRPM | HEIGHT: 62 IN | DIASTOLIC BLOOD PRESSURE: 84 MMHG | TEMPERATURE: 99.1 F | WEIGHT: 190 LBS

## 2019-08-30 PROCEDURE — 74011250637 HC RX REV CODE- 250/637: Performed by: ADVANCED PRACTICE MIDWIFE

## 2019-08-30 RX ORDER — IBUPROFEN 600 MG/1
600 TABLET ORAL
Qty: 30 TAB | Refills: 1 | Status: SHIPPED | OUTPATIENT
Start: 2019-08-30

## 2019-08-30 RX ADMIN — IBUPROFEN 800 MG: 400 TABLET, FILM COATED ORAL at 02:01

## 2019-08-30 RX ADMIN — IBUPROFEN 800 MG: 400 TABLET, FILM COATED ORAL at 11:26

## 2019-08-30 NOTE — PROGRESS NOTES
Spiritual Care Partner Volunteer visited patient in room 339/01 on 8.30.19. Documented by: : Rev. Víctor Kaufman.  [facility]; [facility], to contact [phone] [address] call: [phone]

## 2019-08-30 NOTE — DISCHARGE INSTRUCTIONS
POSTPARTUM DISCHARGE INSTRUCTIONS       Name:  Hubert Dash  YOB: 1995  Admission Diagnosis:  Pregnancy [Z34.90]     Discharge Diagnosis:    Problem List as of 8/30/2019 Never Reviewed          Codes Class Noted - Resolved    Pregnancy ICD-10-CM: Z34.90  ICD-9-CM: V22.2  8/28/2019 - Present            Attending Physician:  Santana Ferguson,*    Delivery Type:  Vaginal Childbirth: What To Expect At Home    Your Recovery: Your body will slowly heal in the next few weeks. It is easy to get too tired and overwhelmed during the first weeks after your baby is born. Changes in your hormones can shift your mood without warning. You may find it hard to meet the extra demands on your energy and time. Take it easy on yourself. Follow-up care is a key part of your treatment and safety. Be sure to make and go to all appointments, and call your doctor if you are having problems. It's also a good idea to know your test results and keep a list of the medicines you take. How can you care for yourself at home? Vaginal bleeding and cramps  · After delivery, you will have a bloody discharge from the vagina. This will turn pink within a week and then white or yellow after about 10 days. It may last for 2 to 4 weeks or longer, until the uterus has healed. Use pads instead of tampons until you stop bleeding. · Do not worry if you pass some blood clots, as long as they are smaller than a golf ball. If you have a tear or stitches in your vaginal area, change the pad at least every 4 hours to prevent soreness and infection. · You may have cramps for the first few days after childbirth. These are normal and occur as the uterus shrinks to normal size. Take an over-the-counter pain medicine, such as acetaminophen (Tylenol), ibuprofen (Advil, Motrin), or naproxen (Aleve), for cramps. Read and follow all instructions on the label. Do not take aspirin, because it can cause more bleeding.   Do not take acetaminophen (Tylenol) and other acetaminophen containing medications (i.e. Percocet) at the same time. Episiotomy, Lacerations or Tears  · If you have stitches, they will dissolve on their own and do not need to be removed. · Put ice or a cold pack on your painful area for 10 to 20 minutes at a time, several times a day, for the first few days. Put a thin cloth between the ice and your skin. · Sit in a few inches of warm water (sitz bath) 3 times a day and after bowel movements. The warm water helps with pain and itching. If you do not have a tub, a warm shower might help. Breast fullness  · Your breasts may overfill (engorge) in the first few days after delivery. To help milk flow and to relieve pain, warm your breasts in the shower or by using warm, moist towels before nursing. · If you are not nursing, do not put warmth on your breasts or touch your breasts. Wear a tight bra or sports bra and use ice until the fullness goes away. This usually takes 2 to 3 days. · Put ice or a cold pack on your breast after nursing to reduce swelling and pain. Put a thin cloth between the ice and your skin. Activity  · Eat a balanced diet. Do not try to lose weight by cutting calories. Keep taking your prenatal vitamins, or take a multivitamin. · Get as much rest as you can. Try to take naps when your baby sleeps during the day. · Get some exercise every day. But do not do any heavy exercise until your doctor says it is okay. · Wait until you are healed (about 4 to 6 weeks) before you have sexual intercourse. Your doctor will tell you when it is okay to have sex. · Talk to your doctor about birth control. You can get pregnant even before your period returns. Also, you can get pregnant while you are breast-feeding. Mental Health  · Many women get the \"baby blues\" during the first few days after childbirth. You may lose sleep, feel irritable, and cry easily. You may feel happy one minute and sad the next. Hormone changes are one cause of these emotional changes. Also, the demands of a new baby, along with visits from relatives or other family needs, add to a mother's stress. The \"baby blues\" often peak around the fourth day. Then they ease up in less than 2 weeks. · If your moodiness or anxiety lasts for more than 2 weeks, or if you feel like life is not worth living, you may have postpartum depression. This is different for each mother. Some mothers with serious depression may worry intensely about their infant's well-being. Others may feel distant from their child. Some mothers might even feel that they might harm their baby. A mother may have signs of paranoia, wondering if someone is watching her. · With all the changes in your life, you may not know if you are depressed. Pregnancy sometimes causes changes in how you feel that are similar to the symptoms of depression. · Symptoms of depression include:  · Feeling sad or hopeless and losing interest in daily activities. These are the most common symptoms of depression. · Sleeping too much or not enough. · Feeling tired. You may feel as if you have no energy. · Eating too much or too little. · POSTPARTUM [employer] (PSI) offers a Warm line; Chat with the Expert phone sessions; Information and Articles about Pregnancy and Postpartum Mood Disorders; Comprehensive List of Free Support Groups; Knowledgeable local coordinators who will offer support, information, and resources; Guide to Resources on Data Driven Delivery System; Calendar of events in the  mood disorders community; Latest News and Research; and [facility] [address] for [employer]. Remember - You are not alone; You are not to blame; With help, you will be well. 8-840-494-PPD(7016). WWW. POSTPARTUM. NET   · Writing or talking about death, such as writing suicide notes or talking about guns, knives, or pills.  Keep the numbers for these national suicide hotlines: 3-903-181-TALK (2-189.320.2930) and 5-140-KKLXAPI (2-604.438.6619). If you or someone you know talks about suicide or feeling hopeless, get help right away. Constipation and Hemorrhoids  · Drink plenty of fluids, enough so that your urine is light yellow or clear like water. If you have kidney, heart, or liver disease and have to limit fluids, talk with your doctor before you increase the amount of fluids you drink. · Eat plenty of fiber each day. Have a bran muffin or bran cereal for breakfast, and try eating a piece of fruit for a mid-afternoon snack. · For painful, itchy hemorrhoids, put ice or a cold pack on the area several times a day for 10 minutes at a time. Follow this by putting a warm compress on the area for another 10 to 20 minutes or by sitting in a shallow, warm bath. Additional Information:  Postpartum Support    PARENTS:  Are you feeling sad or depressed? Is it difficult for you to enjoy yourself? Do you feel more irritable or tense? Do you feel anxious or panicky? Are you having difficulty bonding with your baby? Do you feel as if you are \"out of control\" or \"going crazy\"? Are you worried that you might hurt your baby or yourself? FAMILIES: Do you worry that something is wrong but don't know how to help? Do you think that your partner or spouse is having problems coping? Are you worried that it may never get better? While many women experience some mild mood change or \"the blues\" during or after the birth of a child, 1 in 9 women experience more significant symptoms of depression or anxiety. 1 in 10 Dads become depressed during the first year. Things you can do  Being a good parent includes taking care of yourself. If you take care of yourself, you will be able to take better care of your baby and your family. · Talk to a counselor or healthcare provider who has training in  mood and anxiety problems. · Learn as much as you can about pregnancy and postpartum depression and anxiety.   · Get support from family and friends. Ask for help when you need it. · Join a support group in your area or online. · Keep active by walking, stretching or whatever form of exercise helps you to feel better. · Get enough rest and time for yourself. · Eat a healthy diet. · Don't give up! It may take more than one try to get the right help you need. When should you call for help? Call 911 anytime you think you may need emergency care. For example, call if:  · You are thinking of hurting yourself, your baby, or anyone else. · You passed out (lost consciousness). · You have symptoms of a blood clot in your lung (called a pulmonary embolism). These may include:    · Sudden chest pain. · Trouble breathing. · Coughing up blood. Call your doctor now or seek immediate medical care if:  · You have severe vaginal bleeding. · You are soaking through a pad each hour for 2 or more hours. · Your vaginal bleeding seems to be getting heavier or is still bright red 4 days after delivery. · You are dizzy or lightheaded, or you feel like you may faint. · You are vomiting or cannot keep fluids down. · You have a fever. · You have new or more belly pain. · You pass tissue (not just blood). · Your vaginal discharge smells bad. · Your belly feels tender or full and hard. · Your breasts are continuously painful or red. · You feel sad, anxious, or hopeless for more than a few days. · You have sudden, severe pain in your belly. · You have symptoms of a blood clot in your leg (called a deep vein thrombosis),          such as:  · Pain in your calf, back of the knee, thigh, or groin. · Redness and swelling in your leg or groin. · You have symptoms of preeclampsia, such as:  · Sudden swelling of your face, hands, or feet. · New vision problems (such as dimness or blurring). · A severe headache. · Your blood pressure is higher than it should be or rises suddenly. · You have new nausea or vomiting.     Watch closely for changes in your health, and be sure to contact your doctor if you have any problems. These are general instructions for a healthy lifestyle:    No smoking/ No tobacco products/ Avoid exposure to second hand smoke    Surgeon General's Warning:  Quitting smoking now greatly reduces serious risk to your health. Obesity, smoking, and sedentary lifestyle greatly increases your risk for illness    A healthy diet, regular physical exercise & weight monitoring are important for maintaining a healthy lifestyle    Recognize signs and symptoms of STROKE:    F-face looks uneven    A-arms unable to move or move unevenly    S-speech slurred or non-existent    T-time-call 911 as soon as signs and symptoms begin - DO NOT go       back to bed or wait to see if you get better - TIME IS BRAIN. I have had the opportunity to make my options or choices for discharge. I have received and understand these instructions. Additional Information:  Learning About Hypertensive Disorders After Childbirth    What is preeclampsia? A woman with preeclampsia has blood pressure that is higher than usual. She may also have other serious symptoms. Preeclampsia can be dangerous. When it is severe, it can cause seizures (eclampsia) or liver or kidney damage. When the liver is affected, some women get HELLP syndrome, a blood-clotting and bleeding problem. HELLP can come on quickly and can be deadly. This is why your doctor checks you and your baby often. Preeclampsia usually occurs after 20 weeks of pregnancy. In rare cases, it is first noted right after childbirth. Most often, it starts near the end of pregnancy and goes away after childbirth. What are the symptoms? Mild preeclampsia usually doesn't cause symptoms. But preeclampsia can cause rapid weight gain and sudden swelling of the hands and face. Severe preeclampsia does cause symptoms. It can cause a very bad headache and trouble seeing and breathing.  It also can cause belly pain. You may also urinate less than usual.    If you have new preeclampsia symptoms after you go home from the hospital, call your doctor right away. What can you expect after you have had preeclampsia? In the hospital  After the baby and the placenta are delivered, preeclampsia usually starts to improve. Most women get better in the first few days after childbirth. After having preeclampsia, you still have a risk of seizures for a day or more after childbirth. (Very rarely, seizures happen later on.) So your doctor may have you take magnesium sulfate for a day or more to prevent seizures. You may also take medicine to lower your blood pressure. When you go home  Your blood pressure will most likely return to normal a few days after delivery. Your doctor will want to check your blood pressure sometime in the first week after you leave the hospital.    Some women still have high blood pressure 6 weeks after childbirth. But most return to normal levels over the long term. · Take and record your blood pressure at home if your doctor tells you to. · Learn the importance of the two measures of blood pressure (such as 120 over 80, or 120/80). The first number is the systolic pressure. This is the force of blood on the artery walls as the heart pumps. The second number is the diastolic pressure. This is the force of blood on the artery walls between heartbeats, when the heart is at rest. You have a choice of monitors to use. Manual monitor: You pump up the cuff and use a stethoscope to listen for your  Pulse. · Electronic monitor: The cuff inflates, and a gauge shows your pulse rate. · To take your blood pressure:  · Ask your doctor to check your blood pressure monitor to be sure that it is accurate and that the cuff fits you. Also ask your doctor to watch you use it, to make sure that you are using it right.   · You should not eat, use tobacco products, or use medicine known to raise blood pressure (such as some nasal decongestant sprays) before you take your blood pressure. · Avoid taking your blood pressure if you have just exercised or are nervous or upset. Rest at least 15 minutes before you take your blood pressure. · Be safe with medicines. If you take medicine, take it exactly as prescribed. Call your doctor if you think you are having a problem with your medicine. · Do not smoke. Quitting smoking will help lower your blood pressure and improve your baby's growth and health. If you need help quitting, talk to your doctor about stop-smoking programs and medicines. These can increase your chances of quitting for good. · Eat a balanced and healthy diet that has lots of fruits and vegetables. Long-term health   After you have had preeclampsia, you have a higher-than-average risk of heart disease, stroke, and kidney disease. This may be because the same things that cause preeclampsia also cause heart and kidney disease. To protect your health, work with your doctor on living a heart-healthy lifestyle and getting the checkups you need. Your doctor may also want you to check your blood pressure at home. Follow-up care is a key part of your treatment and safety. Be sure to make and go to all appointments, and call your doctor if you are having problems. It's also a good idea to know your test results and keep a list of the medicines you take. These are general instructions for a healthy lifestyle:    No smoking/ No tobacco products/ Avoid exposure to second hand smoke    Surgeon General's Warning:  Quitting smoking now greatly reduces serious risk to your health.     Obesity, smoking, and sedentary lifestyle greatly increases your risk for illness    A healthy diet, regular physical exercise & weight monitoring are important for maintaining a healthy lifestyle    Recognize signs and symptoms of STROKE:    F-face looks uneven    A-arms unable to move or move unevenly    S-speech slurred or non-existent    T-time-call 911 as soon as signs and symptoms begin - DO NOT go       back to bed or wait to see if you get better - TIME IS BRAIN. I have had the opportunity to make my options or choices for discharge. I have received and understand these instructions.

## 2019-08-30 NOTE — DISCHARGE SUMMARY
Obstetrical Discharge Summary     Name: Petty Duncan MRN: [id number]  SSN: [id number]    YOB: 1995  Age: 21 y.o. Sex: female      Admit Date: 2019    Discharge Date: 2019     Admitting Physician: Mee Reagan MD     Attending Physician:  Julius Vance, Ramya Sanches,*     Admission Diagnoses: Pregnancy [Z34.90]    Discharge Diagnoses:   Information for the patient's :  Sherin Quezada [[id number]]   Delivery of a 3.675 kg female infant via Vaginal, Spontaneous on 2019 at 5:10 AM  by Stephan Blackman. Apgars were 9  and 9 . Additional Diagnoses:   Hospital Problems  Never Reviewed          Codes Class Noted POA    Pregnancy ICD-10-CM: Z34.90  ICD-9-CM: V22.2  2019 Unknown             Lab Results   Component Value Date/Time    Rubella, External immune 2019    GrBStrep, External negative 2019       Hospital Course: Normal hospital course following the delivery. Patient Instructions:   Current Discharge Medication List      START taking these medications    Details   ibuprofen (MOTRIN) 600 mg tablet Take 1 Tab by mouth every six (6) hours as needed for Pain. Qty: 30 Tab, Refills: 1         CONTINUE these medications which have NOT CHANGED    Details   prenatal vit calc,iron,folic (PRENATAL VITAMIN PO) Take 1 Tab by mouth daily. Indications: pregnancy         STOP taking these medications       butalbital-acetaminophen-caffeine (FIORICET, ESGIC) -40 mg per tablet Comments:   Reason for Stopping:               Disposition at Discharge: Home or self care    Condition at Discharge: Stable    Reference my discharge instructions.     Follow-up Appointments   Procedures    FOLLOW UP VISIT Appointment in: 6 Weeks     Standing Status:   Standing     Number of Occurrences:   1     Order Specific Question:   Appointment in     Answer:   6 Weeks        Signed By:  Omar Alejo MD     2019

## 2019-08-30 NOTE — PROGRESS NOTES
Post-Partum Day Number 1 Progress Note    Leena Sierra     Assessment: Doing well, post partum day 1    Plan:  1. Continue routine postpartum and perineal care as well as maternal education. 2. BPs WNL, not on any meds. No HA/RUQ pain or visual changes. 3. Anticipate discharge home today if baby ok for DC. Information for the patient's :  Lindsey Francisco [[id number]]   Vaginal, Spontaneous   Patient doing well without significant complaint. Voiding without difficulty, normal lochia. Vitals:  Visit Vitals  /85 (BP 1 Location: Right arm, BP Patient Position: At rest)   Pulse 73   Temp 98.4 °F (36.9 °C)   Resp 16   Ht 5' 2\" (1.575 m)   Wt 86.2 kg (190 lb)   SpO2 97%   Breastfeeding? Unknown   BMI 34.75 kg/m²     Temp (24hrs), Av.4 °F (36.9 °C), Min:97.6 °F (36.4 °C), Max:99 °F (37.2 °C)        Exam:   Patient without distress. Abdomen soft, fundus firm, nontender                Perineum with normal lochia noted. Lower extremities are negative for swelling, cords or tenderness. Labs:     Lab Results   Component Value Date/Time    WBC 10.4 2019 01:40 PM    HGB 11.6 2019 01:40 PM    HCT 36.6 2019 01:40 PM    PLATELET 181  01:40 PM       No results found for this or any previous visit (from the past 24 hour(s)).

## 2024-05-03 NOTE — ROUTINE PROCESS
0730:Bedside and Verbal shift change report given to RONY Spangler (oncoming nurse) by MATILDE Serna (offgoing nurse). Report included the following information SBAR.     0772: I have reviewed discharge instructions with the patient. The patient verbalized understanding. All questions answered. Pt being discharged home with significant other and mother. Taken to d/c lot by RN in wheelchair.
0840:TRANSFER - IN REPORT:    Verbal report received from ILEANA Tran(name) on Klarissa Real  being received from L+D(unit) for routine progression of care      Report consisted of patients Situation, Background, Assessment and   Recommendations(SBAR). Information from the following report(s) SBAR was reviewed with the receiving nurse. Opportunity for questions and clarification was provided. Assessment completed upon patients arrival to unit and care assumed. 1430: Pt ambulated to bathroom with nursing assistance x 1 with no complications. Pt voided x 1, check void completed (pt had first void in L+D). 1650: Pt states that after taking last two doses of tylenol she felt lightheaded and \"out of it\". Pt requests not to take any more tylenol going forward. Will notify night RN.
Bedside shift change report given to MATILDE Serna RN (oncoming nurse) by Paty San. Amaya Porter RN (offgoing nurse). Report included the following information SBAR, Kardex, Intake/Output, MAR and Recent Results.
Pulmonary embolism